# Patient Record
Sex: FEMALE | Race: WHITE | NOT HISPANIC OR LATINO
[De-identification: names, ages, dates, MRNs, and addresses within clinical notes are randomized per-mention and may not be internally consistent; named-entity substitution may affect disease eponyms.]

---

## 2021-02-18 PROBLEM — Z00.00 ENCOUNTER FOR PREVENTIVE HEALTH EXAMINATION: Status: ACTIVE | Noted: 2021-02-18

## 2021-02-22 ENCOUNTER — APPOINTMENT (OUTPATIENT)
Dept: NEUROSURGERY | Facility: CLINIC | Age: 56
End: 2021-02-22
Payer: COMMERCIAL

## 2021-02-22 VITALS
SYSTOLIC BLOOD PRESSURE: 134 MMHG | TEMPERATURE: 97.2 F | DIASTOLIC BLOOD PRESSURE: 83 MMHG | HEIGHT: 62 IN | HEART RATE: 91 BPM | BODY MASS INDEX: 41.41 KG/M2 | WEIGHT: 225 LBS

## 2021-02-22 PROCEDURE — 99205 OFFICE O/P NEW HI 60 MIN: CPT

## 2021-02-22 PROCEDURE — 99072 ADDL SUPL MATRL&STAF TM PHE: CPT

## 2021-02-22 NOTE — END OF VISIT
[FreeTextEntry3] : I have seen the patient and reviewed the case together with PA and I agree with the final recommendations and plan of care.\par \par Puneet Araya MD\par Neurosurgery\par \par  [Time Spent: ___ minutes] : I have spent [unfilled] minutes of time on the encounter. [>50% of the face to face encounter time was spent on counseling and/or coordination of care for ___] : Greater than 50% of the face to face encounter time was spent on counseling and/or coordination of care for [unfilled]

## 2021-02-22 NOTE — HISTORY OF PRESENT ILLNESS
[de-identified] : SUNNY AVALOS is a 55 year female with a PMH of DM, Migraines, ex-smoker quit 1 yr ago, long-standing neck and back pain who presents to the office today for neurosurgical consultation due to cervical radiculopathy.  The patient complains of right sided posterolateral neck pain worse for the past 6 months after chiropractic adjustment.  She complains of radiation to right shoulder, but denies radicular pain to arm, numbness or tingling or clumsiness/dropping objects.  She has taken tylenol/meloxicam without much relief.  History of falls over the past few years.  Has not seen pain management or PT.  \par

## 2021-02-22 NOTE — DATA REVIEWED
[de-identified] : O [de-identified] : Cspine 1/31/21: no evidence of disc herniation or spinal canal stenosis.  Reversal of the cervical lordosis, Osteoarthritis of C6-7.  [de-identified] : 10/16/20 disc space narrowing at C2-3 and C6-7 and posterior hypertrophic spur formation at C6-7

## 2021-02-22 NOTE — PHYSICAL EXAM
[Person] : oriented to person [Place] : oriented to place [Time] : oriented to time [Short Term Intact] : short term memory intact [Remote Intact] : remote memory intact [Span Intact] : the attention span was normal [Concentration Intact] : normal concentrating ability [Fluency] : fluency intact [Comprehension] : comprehension intact [Current Events] : adequate knowledge of current events [Past History] : adequate knowledge of personal past history [Vocabulary] : adequate range of vocabulary [Cranial Nerves Optic (II)] : visual acuity intact bilaterally,  pupils equal round and reactive to light [Cranial Nerves Oculomotor (III)] : extraocular motion intact [Cranial Nerves Trigeminal (V)] : facial sensation intact symmetrically [Cranial Nerves Facial (VII)] : face symmetrical [Cranial Nerves Vestibulocochlear (VIII)] : hearing was intact bilaterally [Cranial Nerves Glossopharyngeal (IX)] : tongue and palate midline [Cranial Nerves Accessory (XI - Cranial And Spinal)] : head turning and shoulder shrug symmetric [Cranial Nerves Hypoglossal (XII)] : there was no tongue deviation with protrusion [Motor Tone] : muscle tone was normal in all four extremities [Motor Strength] : muscle strength was normal in all four extremities [No Muscle Atrophy] : normal bulk in all four extremities [Sensation Tactile Decrease] : light touch was intact [Abnormal Walk] : normal gait [Balance] : balance was intact [2+] : Patella left 2+ [General Appearance - Alert] : alert [General Appearance - In No Acute Distress] : in no acute distress [Oriented To Time, Place, And Person] : oriented to person, place, and time [Impaired Insight] : insight and judgment were intact [Affect] : the affect was normal [Past-pointing] : there was no past-pointing [Tremor] : no tremor present

## 2021-02-22 NOTE — ASSESSMENT
[FreeTextEntry1] : I have discussed the natural history and treatment options for cervical radiculopathy with the patient. I explained the indications for observation, conservative management, medical management, physical therapy, pain management approaches and surgery.  In the end, I recommend conservative management in the form of pain management referral and Physical therapy for her C6-7 neural foraminal stenosis.  She will return to us in 3 months time for follow up.  The patient understands the plan of care and is in agreement.  All questions answered to patient satisfaction.\par \par \par \par

## 2021-02-26 ENCOUNTER — APPOINTMENT (OUTPATIENT)
Dept: PAIN MANAGEMENT | Facility: CLINIC | Age: 56
End: 2021-02-26
Payer: COMMERCIAL

## 2021-02-26 VITALS — HEART RATE: 77 BPM | DIASTOLIC BLOOD PRESSURE: 82 MMHG | RESPIRATION RATE: 14 BRPM | SYSTOLIC BLOOD PRESSURE: 132 MMHG

## 2021-02-26 DIAGNOSIS — M47.812 SPONDYLOSIS W/OUT MYELOPATHY OR RADICULOPATHY, CERVICAL REGION: ICD-10-CM

## 2021-02-26 DIAGNOSIS — M79.10 MYALGIA, UNSPECIFIED SITE: ICD-10-CM

## 2021-02-26 DIAGNOSIS — M54.12 RADICULOPATHY, CERVICAL REGION: ICD-10-CM

## 2021-02-26 DIAGNOSIS — Z87.39 PERSONAL HISTORY OF OTHER DISEASES OF THE MUSCULOSKELETAL SYSTEM AND CONNECTIVE TISSUE: ICD-10-CM

## 2021-02-26 PROCEDURE — 99204 OFFICE O/P NEW MOD 45 MIN: CPT

## 2021-02-26 PROCEDURE — 99072 ADDL SUPL MATRL&STAF TM PHE: CPT

## 2021-02-26 RX ORDER — METFORMIN HYDROCHLORIDE 500 MG/1
500 TABLET, COATED ORAL
Refills: 0 | Status: ACTIVE | COMMUNITY

## 2021-02-26 NOTE — PHYSICAL EXAM
[de-identified] : Constitutional: Well-developed, in no acute distress\par Eyes: Pupil- Right: normal, Left: normal\par Neck exam: Inspection normal\par Respiratory: Normal effort, speaking in full sentences\par Cardiovascular: Regular rate and rhythm\par Vascular: Dorsal pedis pulses normal and equal bilaterally\par Abdomen: Inspection normal, no distension\par Skin: Inspection normal, no bruising noted\par Musculoskeletal:\par Cervical Spine:   \par Gait: Antalgic\par Inspection: Normal curvature, no abnormal kyphosis or scoliosis\par \par Facet loading: pain bilaterally\par \par Palpation:\par Cervical paraspinal muscles: pain bilaterally\par 		\par Muscle Strength:\par Deltoid: 5/5 bilaterally\par Biceps: 5/5 bilaterally\par Triceps: 5/5 bilaterally\par Adductor pollicis: 5/5 bilaterally\par \par Sensation: normal and equal in bilateral upper extremities\par \par Reflexes:\par Biceps (C5): 2+ bilaterally\par Brachioradialis (C6): 2+ bilaterally\par Triceps (C7): 2+ bilaterally\par \par Extremity: no edema noted\par Neurological: Memory normal, AAO x 3, Cranial nerves II - XII grossly normal\par Psychiatric: Appropriate mood and affect, oriented to time, place, person, and situation\par \par \par

## 2021-02-26 NOTE — ASSESSMENT
[FreeTextEntry1] : 55 year female with a PMH of DM, Migraines, ex-smoker quit 1 yr ago, long-standing neck and back pain who presents to the office today for pain management consultation due to cervical radiculopathy. The patient complains of right sided posterolateral neck pain worse for the past 6 months after chiropractic adjustment. I have personally reviewed the patient's MRI in detail and discussed it with them which is significant for osteoarthritis at C6-C7. The patient has failed to have relief with medication management and physical therapy. No relief w/ six weeks of chiropractic care within the last three months. Given the patients failure to improve with all other conservative measures, recommend right C3, C4, C5, and C6 medial branch diagnostic block under fluoroscopic guidance. If the patient has significant relief of pain and improved quality of life following diagnostic block, will proceed to radiofrequency ablation.\par \par

## 2021-02-26 NOTE — CONSULT LETTER
[Dear  ___] : Dear  [unfilled], [Consult Letter:] : I had the pleasure of evaluating your patient, [unfilled]. [Please see my note below.] : Please see my note below. [Consult Closing:] : Thank you very much for allowing me to participate in the care of this patient.  If you have any questions, please do not hesitate to contact me. [Sincerely,] : Sincerely, [FreeTextEntry3] : Rupesh Stratton MD\par

## 2021-02-26 NOTE — HISTORY OF PRESENT ILLNESS
[FreeTextEntry1] : 55 year female with a PMH of DM, Migraines, ex-smoker quit 1 yr ago, long-standing neck and back pain who presents to the office today for pain management consultation due to cervical radiculopathy. The patient complains of right sided posterolateral neck pain worse for the past 6 months after chiropractic adjustment. She complains of radiation to right shoulder, but denies radicular pain to arm, numbness or tingling or clumsiness/dropping objects. Reports a history of falls. Chiropractic care had benefited her previously but is recent incident injured her.

## 2021-03-02 ENCOUNTER — APPOINTMENT (OUTPATIENT)
Dept: PAIN MANAGEMENT | Facility: HOSPITAL | Age: 56
End: 2021-03-02
Payer: COMMERCIAL

## 2021-03-02 PROCEDURE — 99443: CPT

## 2021-03-06 ENCOUNTER — RESULT REVIEW (OUTPATIENT)
Age: 56
End: 2021-03-06

## 2021-03-09 ENCOUNTER — RESULT REVIEW (OUTPATIENT)
Age: 56
End: 2021-03-09

## 2021-03-09 ENCOUNTER — APPOINTMENT (OUTPATIENT)
Dept: PAIN MANAGEMENT | Facility: HOSPITAL | Age: 56
End: 2021-03-09

## 2021-03-10 ENCOUNTER — APPOINTMENT (OUTPATIENT)
Dept: PAIN MANAGEMENT | Facility: CLINIC | Age: 56
End: 2021-03-10
Payer: COMMERCIAL

## 2021-03-10 PROCEDURE — 99443: CPT

## 2021-03-15 ENCOUNTER — APPOINTMENT (OUTPATIENT)
Dept: PAIN MANAGEMENT | Facility: CLINIC | Age: 56
End: 2021-03-15
Payer: COMMERCIAL

## 2021-03-15 PROCEDURE — 99443: CPT

## 2021-03-19 ENCOUNTER — APPOINTMENT (OUTPATIENT)
Dept: PAIN MANAGEMENT | Facility: CLINIC | Age: 56
End: 2021-03-19
Payer: COMMERCIAL

## 2021-03-19 PROCEDURE — 99443: CPT

## 2021-03-26 ENCOUNTER — RESULT REVIEW (OUTPATIENT)
Age: 56
End: 2021-03-26

## 2021-03-29 ENCOUNTER — RESULT REVIEW (OUTPATIENT)
Age: 56
End: 2021-03-29

## 2021-03-29 ENCOUNTER — APPOINTMENT (OUTPATIENT)
Dept: PAIN MANAGEMENT | Facility: CLINIC | Age: 56
End: 2021-03-29

## 2021-04-12 ENCOUNTER — APPOINTMENT (OUTPATIENT)
Dept: PAIN MANAGEMENT | Facility: CLINIC | Age: 56
End: 2021-04-12
Payer: COMMERCIAL

## 2021-04-12 DIAGNOSIS — M47.817 SPONDYLOSIS W/OUT MYELOPATHY OR RADICULOPATHY, LUMBOSACRAL REGION: ICD-10-CM

## 2021-04-12 DIAGNOSIS — M54.16 RADICULOPATHY, LUMBAR REGION: ICD-10-CM

## 2021-04-12 PROCEDURE — 99443: CPT

## 2021-04-25 ENCOUNTER — RESULT REVIEW (OUTPATIENT)
Age: 56
End: 2021-04-25

## 2021-04-29 ENCOUNTER — APPOINTMENT (OUTPATIENT)
Dept: PAIN MANAGEMENT | Facility: CLINIC | Age: 56
End: 2021-04-29
Payer: COMMERCIAL

## 2021-04-29 PROCEDURE — 99443: CPT

## 2021-06-07 ENCOUNTER — APPOINTMENT (OUTPATIENT)
Dept: NEUROSURGERY | Facility: CLINIC | Age: 56
End: 2021-06-07

## 2021-06-09 ENCOUNTER — RX RENEWAL (OUTPATIENT)
Age: 56
End: 2021-06-09

## 2021-06-09 RX ORDER — TIZANIDINE 2 MG/1
2 TABLET ORAL
Qty: 30 | Refills: 2 | Status: ACTIVE | COMMUNITY
Start: 2021-03-19 | End: 1900-01-01

## 2021-07-10 NOTE — DATA REVIEWED
[de-identified] : \par  [facility] MRI Report             Final\par \par No Documents Attached\par \par \par \par \par   [facility]\par                                          [address]\par                                    [place], [place]  [address]\par                                        Department of Radiology\par                                             [phone]\par \par \par Patient Name:      SUNNY AVALOS                Location:       [facility]\par Med Rec #:        [id number]                    Account #:      [id number]\par YOB: 1965                    Ordering:       Rupesh Stratton MD\par Age: 56               Sex:    F                 Attending:      Rupesh Stratton MD\par PCP:        Ethel Escobar D.O.\par ______________________________________________________________________________________\par \par Exam Date:      04/25/21\par Exam:         MRI LUMBAR SPINE\par Order#:       [id number]\par \par \par \par PROCEDURE: MRI of the lumbar spine without contrast\par \par  INDICATION: Low back pain\par \par  TECHNIQUE: Multiplanar, sequential images of the lumbar spine were obtained without the use of\par intravenous contrast, according to protocol.\par \par  COMPARISON: None\par \par  FINDINGS:\par \par  Note is made of partial lumbarization of the S1 vertebral body, with a recommend treatment disc at\par S1-S2. There is grade 1 anterolisthesis of L5 on S1. Probable prior L5 laminectomy.. The vertebral\par body heights are maintained. The vertebral body marrow signal is appropriate for the patient's\par stated age.\par \par  The conus medullaris ends at L2.\par \par  L1/2 level: No disc herniation, central canal or neural foraminal narrowing.\par \par  L2/3  level: No disc herniation, central canal or neural foraminal narrowing.\par \par  L3/4 level: No disc herniation, central canal or neural foraminal narrowing.\par \par  L4/5 level: No disc herniation, central canal or neural foraminal narrowing.\par \par  L5/S1 level: Grade 1 anterolisthesis. Minimal posterior disc bulge and moderate bilateral facet\par arthropathy resulting in minimal bilateral neural foraminal narrowing without significant central\par canal narrowing.\par \par \par  The paravertebral soft tissues demonstrate a cyst within the right renal cortex.\par \par \par \par  IMPRESSION:\par \par  Grade 1 anterolisthesis at L5-S1. There is mild bilateral neural foraminal narrowing at L5-S1. No\par areas of significant central canal narrowing.\par \par \par ***Electronically Signed ***\par -----------------------------------------------\par SERGE GEIGER MD              04/25/21 1721\par \par Dictated on 04/25/21\par \par \par Report cc:  Rupesh Stratton MD;\par \par  \par \par  Ordered by: RUPESH STRATTON       Collected/Examined: 25Apr2021 02:56PM       \par Verified by: RUPESH STRATTON 26Apr2021 10:03AM       \par  Result Communication: No patient communication needed at this time;\par Stage: Final       \par  Performed at: [facility]       Resulted: 25Apr2021 03:57PM       Last Updated: 26Apr2021 10:03AM       Accession: [id number]       \par Cspine MRI 1/30/21: no disc herniation or spinal stenosis.  Min subluxation of C3 over C4 w/ neural foraminal narrowing on the right an

## 2021-07-10 NOTE — HISTORY OF PRESENT ILLNESS
[de-identified] : SUNNY AVALOS is a 56 year female with a PMH of DM who presents to the office today for follow up of her longstanding neck pain worsened for the last 6 months after chiropractic adjustment.  It radiates to her right shoulder.  No numbness, tingling or clumsiness of hand. She underwent RICHARD with Dr. Stratton but did not achieve significant relief. She was recommended for PT and pain management. She also reports low back pain and had recent MRI Lspine ordered by Dr. Stratton, which shows L5-S1 anterolisthesis, no high grade stenosis and she was advised to follow up with PT. \par \par 2/22/21 SUNNY AVALOS is a 55 year female with a PMH of DM, Migraines, ex-smoker quit 1 yr ago, long-standing neck and back pain who presents to the office today for neurosurgical consultation due to cervical radiculopathy. The patient complains of right sided posterolateral neck pain worse for the past 6 months after chiropractic adjustment. She complains of radiation to right shoulder, but denies radicular pain to arm, numbness or tingling or clumsiness/dropping objects. She has taken tylenol/meloxicam without much relief. History of falls over the past few years. Has not seen pain management or PT.

## 2021-07-12 ENCOUNTER — APPOINTMENT (OUTPATIENT)
Dept: NEUROSURGERY | Facility: CLINIC | Age: 56
End: 2021-07-12